# Patient Record
Sex: FEMALE | Race: BLACK OR AFRICAN AMERICAN | NOT HISPANIC OR LATINO | URBAN - METROPOLITAN AREA
[De-identification: names, ages, dates, MRNs, and addresses within clinical notes are randomized per-mention and may not be internally consistent; named-entity substitution may affect disease eponyms.]

---

## 2024-08-28 ENCOUNTER — OFFICE VISIT (OUTPATIENT)
Dept: ORTHOPEDIC SURGERY | Facility: CLINIC | Age: 54
End: 2024-08-28
Payer: COMMERCIAL

## 2024-08-28 DIAGNOSIS — M54.16 LUMBAR RADICULOPATHY: Primary | ICD-10-CM

## 2024-08-28 PROCEDURE — 1036F TOBACCO NON-USER: CPT | Performed by: ORTHOPAEDIC SURGERY

## 2024-08-28 PROCEDURE — 99204 OFFICE O/P NEW MOD 45 MIN: CPT | Performed by: ORTHOPAEDIC SURGERY

## 2024-08-28 ASSESSMENT — PAIN - FUNCTIONAL ASSESSMENT: PAIN_FUNCTIONAL_ASSESSMENT: 0-10

## 2024-08-28 ASSESSMENT — PAIN SCALES - GENERAL: PAINLEVEL_OUTOF10: 7

## 2024-08-28 NOTE — LETTER
August 28, 2024     Dawson Germain MD  [address]  [facility] [address]    Patient: Susan Alegre   YOB: 1970   Date of Visit: 8/28/2024       Dear Dr. Dawson Germain MD:    Thank you for referring Susan Alegre to me for evaluation. Below are my notes for this consultation.  If you have questions, please do not hesitate to call me. I look forward to following your patient along with you.       Sincerely,     John Ellington MD      CC: No Recipients  ______________________________________________________________________________________    This 54-year-old woman presents with longstanding low back pain and recurrent left leg pain over the last 6 months.    She had a remote lumbar decompression by my colleague, Dr. Gutierrez several years ago.  She did do well with this initially.    She localizes the pain to the left buttock posterior thigh and calf.    She has had some initial physical therapy.    She and Dr. Gutierrez had even discussed the possibility of an instrumented spine fusion several years ago but she deferred.    Family, social, and medical histories are obtained and reviewed.    30-point, patient-recorded Review of Systems is personally obtained and reviewed. Inclusive is no history of weight loss, change in appetite, recent change in activity level, change in bowel or bladder habits, fevers, chills, malaise, or night pain.    On exam, she is a massively obese woman in no acute distress.  She has a kyphotic posture.  Healed lumbar scar.  Painless motion both hips.  Her strength is intact in the lower extremities.    Plain film of the lumbar spine shows a laminectomy defect at L4.  She has lateral listhesis and asymmetric disc space narrowing at L3-4.  She has a slight anterolisthesis of L3 on 4.  He has a very degenerative L4-5 disc space.    Her MRI shows severe stenosis at L3-4.    Impression: This 54-year-old woman has severe recurrent lumbar radiculopathy.  She has severe  stenosis and a deformity at L3-4, above a prior L4 laminectomy.    We have discussed her situation at length.  The degree of stenosis that she has would warrant discussion of surgery.  Surgery would require a revision decompression and instrumented fusion.    Her body habitus and massive obesity however I think would be a contraindication to elective spine surgery, as I think she would be at unacceptably high risk of a perioperative complication, adverse event, or poor clinical outcome.    I think she needs a comprehensive program for weight reduction including nutritional counseling, conditioning and exercise program and appropriate pain management.    Ultimately, if she is able to lose a significant amount of weight and reduce her body mass index lower than 35, discussion of surgery could be considered.    ** Dictated with voice recognition software and not immediately reviewed for errors in grammar and/or spelling **

## 2024-08-28 NOTE — PROGRESS NOTES
This 54-year-old woman presents with longstanding low back pain and recurrent left leg pain over the last 6 months.    She had a remote lumbar decompression by my colleague, Dr. Gutierrez several years ago.  She did do well with this initially.    She localizes the pain to the left buttock posterior thigh and calf.    She has had some initial physical therapy.    She and Dr. Gutierrez had even discussed the possibility of an instrumented spine fusion several years ago but she deferred.    Family, social, and medical histories are obtained and reviewed.    30-point, patient-recorded Review of Systems is personally obtained and reviewed. Inclusive is no history of weight loss, change in appetite, recent change in activity level, change in bowel or bladder habits, fevers, chills, malaise, or night pain.    On exam, she is a massively obese woman in no acute distress.  She has a kyphotic posture.  Healed lumbar scar.  Painless motion both hips.  Her strength is intact in the lower extremities.    Plain film of the lumbar spine shows a laminectomy defect at L4.  She has lateral listhesis and asymmetric disc space narrowing at L3-4.  She has a slight anterolisthesis of L3 on 4.  He has a very degenerative L4-5 disc space.    Her MRI shows severe stenosis at L3-4.    Impression: This 54-year-old woman has severe recurrent lumbar radiculopathy.  She has severe stenosis and a deformity at L3-4, above a prior L4 laminectomy.    We have discussed her situation at length.  The degree of stenosis that she has would warrant discussion of surgery.  Surgery would require a revision decompression and instrumented fusion.    Her body habitus and massive obesity however I think would be a contraindication to elective spine surgery, as I think she would be at unacceptably high risk of a perioperative complication, adverse event, or poor clinical outcome.    I think she needs a comprehensive program for weight reduction including nutritional  counseling, conditioning and exercise program and appropriate pain management.    Ultimately, if she is able to lose a significant amount of weight and reduce her body mass index lower than 35, discussion of surgery could be considered.    ** Dictated with voice recognition software and not immediately reviewed for errors in grammar and/or spelling **

## 2024-09-23 ENCOUNTER — APPOINTMENT (OUTPATIENT)
Dept: PAIN MEDICINE | Facility: HOSPITAL | Age: 54
End: 2024-09-23
Payer: COMMERCIAL

## 2024-09-25 ENCOUNTER — OFFICE VISIT (OUTPATIENT)
Dept: PAIN MEDICINE | Facility: HOSPITAL | Age: 54
End: 2024-09-25
Payer: COMMERCIAL

## 2024-09-25 DIAGNOSIS — M48.062 SPINAL STENOSIS OF LUMBAR REGION WITH NEUROGENIC CLAUDICATION: ICD-10-CM

## 2024-09-25 DIAGNOSIS — M54.16 LUMBAR RADICULOPATHY: Primary | ICD-10-CM

## 2024-09-25 DIAGNOSIS — M71.38 SYNOVIAL CYST OF LUMBAR FACET JOINT: ICD-10-CM

## 2024-09-25 DIAGNOSIS — M96.1 POSTLAMINECTOMY SYNDROME OF LUMBAR REGION: ICD-10-CM

## 2024-09-25 PROCEDURE — 99214 OFFICE O/P EST MOD 30 MIN: CPT | Performed by: PAIN MEDICINE

## 2024-09-25 PROCEDURE — 99204 OFFICE O/P NEW MOD 45 MIN: CPT | Performed by: PAIN MEDICINE

## 2024-09-25 RX ORDER — DICLOFENAC SODIUM 10 MG/G
4 GEL TOPICAL
COMMUNITY
Start: 2023-10-20

## 2024-09-25 RX ORDER — TIZANIDINE 4 MG/1
1 TABLET ORAL 3 TIMES DAILY PRN
COMMUNITY
Start: 2015-03-26 | End: 2024-09-25 | Stop reason: SDUPTHER

## 2024-09-25 RX ORDER — FAMOTIDINE 20 MG/1
1 TABLET, FILM COATED ORAL NIGHTLY
COMMUNITY
Start: 2024-07-19

## 2024-09-25 RX ORDER — TIZANIDINE 4 MG/1
4 TABLET ORAL NIGHTLY PRN
Qty: 90 TABLET | Refills: 0 | Status: SHIPPED | OUTPATIENT
Start: 2024-09-25 | End: 2024-12-24

## 2024-09-25 RX ORDER — ERGOCALCIFEROL 1.25 MG/1
1 CAPSULE ORAL
COMMUNITY
Start: 2018-08-10

## 2024-09-25 RX ORDER — LIDOCAINE 50 MG/G
PATCH TOPICAL
COMMUNITY
Start: 2022-06-16

## 2024-09-25 RX ORDER — DEXTROMETHORPHAN HYDROBROMIDE, GUAIFENESIN 5; 100 MG/5ML; MG/5ML
650 LIQUID ORAL
COMMUNITY
Start: 2023-10-20

## 2024-09-25 ASSESSMENT — PAIN SCALES - GENERAL: PAINLEVEL_OUTOF10: 6

## 2024-09-25 ASSESSMENT — PAIN DESCRIPTION - DESCRIPTORS: DESCRIPTORS: NUMBNESS;TINGLING

## 2024-09-25 ASSESSMENT — PAIN - FUNCTIONAL ASSESSMENT: PAIN_FUNCTIONAL_ASSESSMENT: 0-10

## 2024-09-25 NOTE — PROGRESS NOTES
Subjective   Patient ID: Susan Alegre is a 54 y.o. female with a past medical history of L4-5 laminectomy, chronic back pain    HPI:   Susan is here as a new patient for chronic low back pain with a history of L4-5 laminectomy.  She is a previous patient Dr. Agarwal.  Low back pain has been gone for the past 2 years and progressively worsened.  The pain starts in the low back and radiates down the anterior side of her left leg down to her feet.  She recently also had symptoms going down the right leg.  She has problems walking long distances, noting significant weakness and numbness going down her legs.  She now has to ambulate with a cane.  She also finds herself leaning forward to relieve the symptoms.  Currently takes Tylenol and ibuprofen.  She also has an old prescription of tizanidine that helps some.    Physical Therapy: Currently in prescribed physical therapy.  Other Conservative Measures she has tried: Heating Pad, Ice, Massage/myofascial release, and Injections  Classes of medications tried in the past: Acetaminophen, NSAIDs, and Muscle Relaxants    0-10 (Numeric) Pain Score: 6  Pain Type: Chronic pain  Pain Location: Back  Pain Orientation: Lower  Pain Radiating Towards: left leg pain  Pain Descriptors: Numbness; Tingling  Pain Frequency: Constant/continuous       Review of Systems   13-point ROS done and negative except for HPI.     Current Outpatient Medications   Medication Instructions    acetaminophen (TYLENOL 8 HOUR) 650 mg, oral    diclofenac sodium (VOLTAREN) 4 g, Topical    ergocalciferol (Vitamin D-2) 1.25 MG (79925 UT) capsule 1 capsule, oral, Once Weekly    famotidine (Pepcid) 20 mg tablet 1 tablet, oral, Nightly    lidocaine (Lidoderm) 5 % patch APPLY 1 PATCH TO AFFECTED AREA & LEAVE ON FOR 12 HOURS THEN LEAVE OFFFOR 12 HOURS    tiZANidine (Zanaflex) 4 mg tablet 1 tablet, oral, 3 times daily PRN       No past medical history on file.     Past Surgical History:   Procedure Laterality  Date    BACK SURGERY  08/22/2014    Back Surgery    OTHER SURGICAL HISTORY  07/24/2014    Foot Surgery Left    TONSILLECTOMY  08/22/2014    Tonsillectomy        No family history on file.     No Known Allergies     Objective     There were no vitals filed for this visit.     Physical Exam  General: NAD, well groomed, well nourished  Eyes: Non-icteric sclera, EOMI  Ears, Nose, Mouth, and Throat: External ears and nose appear to be without deformity or rash. No lesions or masses noted. Hearing is grossly intact.   Neck: Trachea midline  Respiratory: Nonlabored breathing   Cardiovascular: no peripheral edema   Skin: No rashes or open lesions/ulcers identified on skin.    Back:   Palpation: No tenderness to palpation over lumbar paraspinous muscles.   Straight leg raise: positive at 45 degrees bilaterally  Neurologic:   Cranial nerves grossly intact.   Strength 5/5 and symmetric plantar/dorsiflexion   Sensation: Normal to light touch throughout, pinprick  intact throughout.    Psychiatric: Alert, orientation to person, place, and time. Cooperative.    Imaging personally reviewed and independently interpreted:   Lumbar MRI  Moderate to severe canal stenosis at L3-4  Disc displacement L3-4 causing bilateral moderate to severe neuroforaminal stenosis  Synovial cyst of the right L3-4 facet joint     L3-L4:  There is mild spinal canal stenosis at L3-4 secondary to a combination of  anterior impingement upon the thecal sac from a broad-based osteophyte  disc complex measuring 6 mm in AP diameter along with posterior  impingement from facet arthropathy and ligamentum flavum thickening.  Ligamentum flavum thickening measures on the order of 5 mm. There also  large bilateral synovial joint effusions in association with the facet  arthropathy. There is a 1.4 cm synovial cyst arising from the posterior  aspect of the right L3-4 facet joint there is bilateral foraminal  stenosis with nerve root impingement, slightly more marked on  the left  secondary to a combination of osteophyte disc encroachment and facet  arthropathy.    L4-L5:  There is a laminectomy defect at L4-5 resulting in a capacious appearance  of the thecal sac despite an osteophyte disc complex encroaching upon the  anterior aspect of thecal sac. The complex measures 2-3 mm in AP  diameter. There is marked narrowing of the L4-5 disc space with type 2  degenerative change (fatty metamorphosis) of the adjacent marrow space.  There is bilateral foraminal stenosis, mild in severity secondary to  encroachment from osteophyte disc complex and facet arthropathy with the  encroachment more marked on the right. There is no deformity of the  exiting nerve root.    Assessment/Plan   Susan's clinical picture and MRI imaging are suggestive of lumbar radiculopathy, especially at L3-4.  She has failed conservative management with medications and physical therapy.  She would benefit from epidural steroid injection.  She endorses minimal relief from previous L4, L5 transforaminal epidural steroid injection with Dr. Agarwal in 2020.  We will try L3-L4 LESI first.  It may take couple injections to adequately manage the pain.  She also has a facet cyst on the right side L3-4.  It leg pain persist, will consider intra-articular facet injection.  Could also consider repeat transforaminal LIZETTE at a future time.  She also endorses some relief with tizanidine which takes a Tylenol.  Will refill her standing prescription.  She is also currently in physical therapy.    Plan:  -Schedule L3-4 LESI with preference.  Procedure, risks benefits, alternate therapies discussed.  Not any blood thinners.  -Tizanidine 4 mg nightly as needed    The patient has failed treatment with : Physical therapy , injections, have significant limitations in their sleep quality due to the pain, have significant limitations of their quality of life due to the pain, have significant impairments of their activities of daily living  (ADLs) due to the pain, and have significant impairments of their instrumental activities of daily living (IADLs) due to the pain    We discussed  the risks, benefits and alternatives of the procedure including but not limited to: , Epidural hematoma, Post-dural puncture headache, Lack of efficacy , Transiently worsening pain , Bleeding, Infection , and Nerve Damage    Follow up: As needed     The patient was invited to contact us back anytime with any questions or concerns and follow-up with us in the office as needed.     Diagnoses and all orders for this visit:  Postlaminectomy syndrome of lumbar region  Lumbar radiculopathy  -     Referral to Pain Management      This note was generated with the aid of dictation software, there may be typos despite my attempts at proofreading.    Faisal Jackson, DO  Pain Fellow

## 2024-10-04 ENCOUNTER — TELEMEDICINE CLINICAL SUPPORT (OUTPATIENT)
Dept: NUTRITION | Facility: CLINIC | Age: 54
End: 2024-10-04
Payer: COMMERCIAL

## 2024-10-04 VITALS — HEIGHT: 67 IN | BODY MASS INDEX: 45.99 KG/M2 | WEIGHT: 293 LBS

## 2024-10-04 DIAGNOSIS — M54.16 LUMBAR RADICULOPATHY: Primary | ICD-10-CM

## 2024-10-04 PROCEDURE — 97802 MEDICAL NUTRITION INDIV IN: CPT | Mod: 95 | Performed by: DIETITIAN, REGISTERED

## 2024-10-04 NOTE — PROGRESS NOTES
"Reason for Nutrition Visit:  Pt is a 54 y.o. female being seen remotely. Pt was  referred for M54.16 (ICD-10-CM) - Lumbar radiculopathy by Pastora Alston LPN  effective 8/28/24. 2 pt identifiers were obtained and pt gave permission for this appointment.     1. Lumbar radiculopathy [M54.16]             Past Medical Hx:  There is no problem list on file for this patient.         Current Outpatient Medications:     acetaminophen (Tylenol 8 HOUR) 650 mg ER tablet, Take 1 tablet (650 mg) by mouth., Disp: , Rfl:     diclofenac sodium (Voltaren) 1 % gel, Apply 4.5 inches (4 g) topically., Disp: , Rfl:     ergocalciferol (Vitamin D-2) 1.25 MG (38666 UT) capsule, Take 1 capsule (1,250 mcg) by mouth 1 (one) time per week., Disp: , Rfl:     famotidine (Pepcid) 20 mg tablet, Take 1 tablet (20 mg) by mouth once daily at bedtime., Disp: , Rfl:     lidocaine (Lidoderm) 5 % patch, APPLY 1 PATCH TO AFFECTED AREA & LEAVE ON FOR 12 HOURS THEN LEAVE OFFFOR 12 HOURS, Disp: , Rfl:     tiZANidine (Zanaflex) 4 mg tablet, Take 1 tablet (4 mg) by mouth as needed at bedtime for muscle spasms., Disp: 90 tablet, Rfl: 0     Anthropometrics:  Anthropometrics  Height: 170.2 cm (5' 7.01\")  Weight: 136 kg (300 lb)  BMI (Calculated): 46.98   Weight change:    Significant Weight Change: No   lbs (80 kg)   10/4/24 over 300 lbs     Lab Results   Component Value Date    HGBA1C 5.6 10/20/2023    CHOL 164 01/04/2022    LDLF 84 01/04/2022    TRIG 84 01/04/2022    HDL 63.3 01/04/2022          Chemistry    Lab Results   Component Value Date/Time     01/04/2022 1348    K 4.5 01/04/2022 1348     01/04/2022 1348    CO2 21 01/04/2022 1348    BUN 19 01/04/2022 1348    CREATININE 1.01 01/04/2022 1348    Lab Results   Component Value Date/Time    CALCIUM 9.5 01/04/2022 1348    ALKPHOS 46 01/04/2022 1348    AST 17 01/04/2022 1348    ALT 12 01/04/2022 1348    BILITOT 0.4 01/04/2022 1348        Food and Nutrition Hx:  Pt would like to lose " "weight. She reports she   Pt is in a lot of pain.   Pt reports she is addicted to sugar. She craves sweets. She wakes up with cravings. She is an emotional eater.   She knows what a meal should be. She has seen a dietitian before. She has been in a weight management program. She can not keep food with sugar in the home, she will eat it.   Pt wakes  at 5:00- 6:00 am. Pt does not care for breakfast.     24 Diet Recall:  Meal 1: Breakfast is skipped.   Meal 2: Lunch may be consumed if desired. She may not desire to eat. She eats a salad with light dressing.   Meal 3: Dinner is at 6:30 pm to include [employer] [place] and [place] (kcal 1000)   Snacks:  Beverages: 1- 2 cups of coffee is in the morning with 3 Arabic Vanilla creamer and an unknown amount of sugar (1 T). ~50 ounces of water per day.     Allergies: None  Intolerance: None  Appetite: Good  Intake: >75%  GI Symptoms : None   Swallowing Difficulty: No problems with swallowing  Dentition : own    Types of Activities: Mostly Sedentary    Sleep duration/quality : 5-6 hours and disrupted sleep. Pt takes  PM to help with sleep. If she does not take a sleep aid, she wakes up every hour.   Sleep disorders: none    Supplements: Multivitamin daily  Binging: Never  Cravings: Sweet  Energy Levels: Stable    Food Preparation: Patient  Cooking Skills/Barriers: None reported  Grocery Shopping: Patient    Nutrition Focused Physical Exam:    Performed/Deferred: Deferred as pt visually appears well-nourished with no signs of malnutrition    Estimated Energy Needs:  Energy Needs  Calculated Energy Needs Using Equations  Height: 170.2 cm (5' 7.01\")  Weight Used for Equation Calculations: 136 kg (300 lb)  Echo Rubi Equation (Overweight or Obese Patients): 1989  Equation Chosen to Use by RD: Jennifer Marie  Activity Factor: 1.2  Total Energy Needs: 2386  Estimated Energy Needs  Total Energy Estimated Needs (kCal): 1886 kCal    Nutrition Diagnosis:  Nutrition Diagnosis   "   Patient has Nutrition Diagnosis Yes   Diagnosis Status (1) New   Nutrition Diagnosis 1 Food and nutrition related knowledge deficit   Related to (1) how to eat for wt loss with current BMI at 47   As Evidenced by (1) reports by pt of the need to learn.   Additional Nutrition Diagnosis Diagnosis 2   Diagnosis Status (2) New   Nutrition Diagnosis 2 Excessive energy intake   Related to (2) selection of high calorie foods and large portions with skipped meals   As Evidenced by (2) kcal intake exceeds kcals recommended for wt loss.   Nutrition Diagnosis 3 Excessive carbohydrate intake   Related to (3) intense drive to eat sugar and seletion of foods and beverages with a large amount of added sugar   As Evidenced by (3) added sugar in the diet exceeds daily limitation recommendations       Nutrition Interventions:  Medical nutrition therapy was given for wt loss.   Nutrition Counseling: Motivational Interviewing and CBT  Coordination of Care: None    Nutrition Recommendations:  1,900 calories per day for 1 lb weight loss per week. Anti-inflammatory diet to help reduce inflammation. Adequate protein intake of 80 ounces per day. Adequate water intake of 64 ounces per day. Added sugars limited to less than 25 grams per day. Adequate fiber of 25 grams per day.     Nutrition Goals:  Via teach back method patient verbalized understanding of the following topics:  1) Aim for three meals and 1 -2 snacks per day. Eating three meals per day can increase the metabolism, this is called the thermal effect of eating. Eating three meals burns more calories than two meals consumed per day. Strive to consume breakfast within 1 -2 hours of waking to jump start the metabolism. Aim for breakfast at 7:00-8:00 am, lunch at 11:00- noon, snack at 4:00 pm, dinner at 6:00 and snack at 9:00- 10:00 pm.   2) Strive to include protein at the meals and even snacks. Protein at meals can increase the metabolism too.  Protein at snacks can sustain  energy, stabilize blood glucose, and provide more contentment. Protein foods include eggs, egg whites, cheese, nuts, nut butters, Greek yogurt, poultry, meat, fish, tofu, plant-based protein powder, and/or plant-based protein drinks.   3) Strive to pay attention to the added sugar within foods. Added sugars can drive more sugar intake. The recommendation for women is to limit added sugar to less than 25 grams per day. To identify a low added sugar food, review the %Daily Value as 5% or less is considered a low added sugar food.    Educational Handouts/Practices: Label Reading: Added Sugar  Next Session: Meditation, Foods to reduce inflammation     Desi Porras MS, RDN, LD, LIONEL   Advanced Practice Clinical Dietitian  [email or web address]  Schedule line [phone]  Direct line [phone]     Readiness to Change : Good  Level of Understanding : Good  Anticipated Compliant : Good

## 2024-10-07 NOTE — H&P
Pain Management H&P    History Of Present Illness  Susan Alegre is a 54 y.o. female presents for procedure state below. Endorses no changes in past medical history or medical health since last seen in clinic.      Past Medical History  She has no past medical history on file.    Surgical History  She has a past surgical history that includes Other surgical history (07/24/2014); Tonsillectomy (08/22/2014); and Back surgery (08/22/2014).     Social History  She reports that she has never smoked. She has never used smokeless tobacco. No history on file for alcohol use and drug use.    Family History  No family history on file.     Allergies  Patient has no known allergies.    Review of Symptoms:   Constitutional: Negative for chills, diaphoresis or fever  HENT: Negative for neck swelling  Eyes:.  Negative for eye pain  Respiratory:.  Negative for cough, shortness of breath or wheezing    Cardiovascular:.  Negative for chest pain or palpitations  Gastrointestinal:.  Negative for abdominal pain, nausea and vomiting  Genitourinary:.  Negative for urgency  Musculoskeletal:  Positive for back pain. Positive for joint pain. Denies falls within the past 3 months.  Skin: Negative for wounds or itching   Neurological: Negative for dizziness, seizures, loss of consciousness and weakness  Endo/Heme/Allergies: Does not bruise/bleed easily  Psychiatric/Behavioral: Negative for depression. The patient does not appear anxious.      Pre-sedation Evaluation  ASA class 3  Mallampati score 3     PHYSICAL EXAM  Vitals signs reviewed  Constitutional:       General: Not in acute distress     Appearance: Normal appearance. Not ill-appearing.  HENT:     Head: Normocephalic and atraumatic  Eyes:     Conjunctiva/sclera: Conjunctivae normal  Cardiovascular:     Rate and Rhythm: Normal rate and regular rhythm  Pulmonary:     Effort: No respiratory distress  Abdominal:     Palpations: Abdomen is soft  Musculoskeletal: CARDENAS  Skin:     General:  Skin is warm and dry  Neurological:     General: No focal deficit present  Psychiatric:         Mood and Affect: Mood normal         Behavior: Behavior normal     Last Recorded Vitals  There were no vitals taken for this visit.    Relevant Results  Current Outpatient Medications   Medication Instructions    acetaminophen (TYLENOL 8 HOUR) 650 mg, oral    diclofenac sodium (VOLTAREN) 4 g, Topical    ergocalciferol (Vitamin D-2) 1.25 MG (56012 UT) capsule 1 capsule, oral, Once Weekly    famotidine (Pepcid) 20 mg tablet 1 tablet, oral, Nightly    lidocaine (Lidoderm) 5 % patch APPLY 1 PATCH TO AFFECTED AREA & LEAVE ON FOR 12 HOURS THEN LEAVE OFFFOR 12 HOURS    tiZANidine (ZANAFLEX) 4 mg, oral, Nightly PRN         MR lumbar spine wo IV contrast     Narrative  PROCEDURE:         SPINE LUMBAR WO CONTRAST - [facility]  2010  REASON FOR EXAM: DISEASE OF SPINAL CORD, UNSPECIFIED    RESULT: MRN: [id number]  Patient Name: TANESHA ALEXANDER    STUDY:  SPINE LUMBAR WO CONTRAST; 8/16/2022 9:06 am    INDICATION:  DISEASE OF SPINAL CORD, UNSPECIFIED. Low back pain with left leg pain and  numbness.    COMPARISON:  12/29/2020    ACCESSION NUMBER(S):  [id number]    ORDERING CLINICIAN:  YVROSE GORE    TECHNIQUE:  Multiplanar T1, T2 and STIR images were obtained.    FINDINGS:  The conus medullaris is normal in appearance. The marrow space signal  intensity is without evidence for acute fracture, marrow space infection or  marrow space neoplasm. There is slight straightening the lumbar lordosis  from L3 through S1.    The thecal sac and intervertebral foramina are capacious from midportion of  T10 through the midportion of L1.    L1-L2:  The thecal sac is capacious at this level. The intervertebral foramina are  widely patent.    L2-L3:  The thecal sac is capacious at this level. The intervertebral foramina are  widely patent. Incidental note is made of facet arthropathy bilaterally.    L3-L4:  There is mild spinal canal stenosis at L3-4 secondary to  a combination of  anterior impingement upon the thecal sac from a broad-based osteophyte disc  complex measuring 6 mm in AP diameter along with posterior impingement from  facet arthropathy and ligamentum flavum thickening. Ligamentum flavum  thickening measures on the order of 5 mm. There also large bilateral  synovial joint effusions in association with the facet arthropathy. There is  a 1.4 cm synovial cyst arising from the posterior aspect of the right L3-4  facet joint there is bilateral foraminal stenosis with nerve root  impingement, slightly more marked on the left secondary to a combination of  osteophyte disc encroachment and facet arthropathy.    L4-L5:  There is a laminectomy defect at L4-5 resulting in a capacious appearance of  the thecal sac despite an osteophyte disc complex encroaching upon the  anterior aspect of thecal sac. The complex measures 2-3 mm in AP diameter.  There is marked narrowing of the L4-5 disc space with type 2 degenerative  change (fatty metamorphosis) of the adjacent marrow space. There is  bilateral foraminal stenosis, mild in severity secondary to encroachment  from osteophyte disc complex and facet arthropathy with the encroachment  more marked on the right. There is no deformity of the exiting nerve root.    L5-S1:  Thecal sac and intervertebral foramina are capacious at L5-S1    Impression  1. Lumbar spondylosis is present as detailed above extending from L3 through  L5 please see the individual disc level report above.  2. Particular note is made of spinal canal stenosis and foraminal stenosis  at L3-4 as described above along with marked facet arthropathy including  bilateral synovial joint effusions along with a synovial cyst on the right  at this level.    Dictation workstation:   [id number]    Original Interpreting Physician:   NADINE BERG M.D.  Original Transcribed by/Date: MMODAL Aug 16 2022  8:09A  Original Electronically Signed by/Date: NADINE BERG M.D. Aug  16 2022  4:33P    Addendum Interpreting Physician:  Addendum Transcribed by/Date: NO ADDENDUM  Addendum Electronically Signed by/Date:     No image results found.       No diagnosis found.     ASSESSMENT/PLAN  Susan Alegre is a 54 y.o. female here for L3-4 LESI    Patient denies any recent antibiotic use or infections, denies any blood thinner use, and denies contrast or local anesthetic allergies     Risks, benefits, alternatives discussed. All questions answered to the best of my ability. Patient agrees to proceed.      Our plan is as follows:  - Proceed with aforementioned procedure          DO CHARLES Pascual Pain fellow

## 2024-10-08 ENCOUNTER — HOSPITAL ENCOUNTER (OUTPATIENT)
Facility: HOSPITAL | Age: 54
Discharge: HOME | End: 2024-10-08
Payer: COMMERCIAL

## 2024-10-08 VITALS
OXYGEN SATURATION: 99 % | WEIGHT: 293 LBS | HEART RATE: 64 BPM | DIASTOLIC BLOOD PRESSURE: 75 MMHG | BODY MASS INDEX: 45.99 KG/M2 | RESPIRATION RATE: 16 BRPM | TEMPERATURE: 33.1 F | SYSTOLIC BLOOD PRESSURE: 133 MMHG | HEIGHT: 67 IN

## 2024-10-08 DIAGNOSIS — M54.16 LUMBAR RADICULOPATHY: ICD-10-CM

## 2024-10-08 PROCEDURE — 2500000004 HC RX 250 GENERAL PHARMACY W/ HCPCS (ALT 636 FOR OP/ED): Performed by: PAIN MEDICINE

## 2024-10-08 PROCEDURE — 99152 MOD SED SAME PHYS/QHP 5/>YRS: CPT | Performed by: PAIN MEDICINE

## 2024-10-08 PROCEDURE — 62323 NJX INTERLAMINAR LMBR/SAC: CPT | Performed by: PAIN MEDICINE

## 2024-10-08 PROCEDURE — 2550000001 HC RX 255 CONTRASTS: Performed by: PAIN MEDICINE

## 2024-10-08 RX ORDER — LIDOCAINE HYDROCHLORIDE 5 MG/ML
INJECTION, SOLUTION INFILTRATION; INTRAVENOUS AS NEEDED
Status: COMPLETED | OUTPATIENT
Start: 2024-10-08 | End: 2024-10-08

## 2024-10-08 RX ORDER — DEXAMETHASONE SODIUM PHOSPHATE 10 MG/ML
INJECTION INTRAMUSCULAR; INTRAVENOUS
Status: DISCONTINUED
Start: 2024-10-08 | End: 2024-10-08 | Stop reason: WASHOUT

## 2024-10-08 RX ORDER — DEXAMETHASONE SODIUM PHOSPHATE 10 MG/ML
INJECTION INTRAMUSCULAR; INTRAVENOUS
Status: DISPENSED
Start: 2024-10-08 | End: 2024-10-08

## 2024-10-08 RX ORDER — DEXAMETHASONE SODIUM PHOSPHATE 10 MG/ML
INJECTION INTRAMUSCULAR; INTRAVENOUS AS NEEDED
Status: COMPLETED | OUTPATIENT
Start: 2024-10-08 | End: 2024-10-08

## 2024-10-08 RX ORDER — FENTANYL CITRATE 50 UG/ML
INJECTION, SOLUTION INTRAMUSCULAR; INTRAVENOUS AS NEEDED
Status: COMPLETED | OUTPATIENT
Start: 2024-10-08 | End: 2024-10-08

## 2024-10-08 RX ORDER — MIDAZOLAM HYDROCHLORIDE 1 MG/ML
INJECTION, SOLUTION INTRAMUSCULAR; INTRAVENOUS AS NEEDED
Status: COMPLETED | OUTPATIENT
Start: 2024-10-08 | End: 2024-10-08

## 2024-10-08 RX ORDER — MIDAZOLAM HYDROCHLORIDE 1 MG/ML
INJECTION INTRAMUSCULAR; INTRAVENOUS
Status: DISPENSED
Start: 2024-10-08 | End: 2024-10-08

## 2024-10-08 RX ORDER — FENTANYL CITRATE 50 UG/ML
INJECTION, SOLUTION INTRAMUSCULAR; INTRAVENOUS
Status: DISPENSED
Start: 2024-10-08 | End: 2024-10-08

## 2024-10-08 RX ORDER — LIDOCAINE HYDROCHLORIDE 5 MG/ML
INJECTION, SOLUTION INFILTRATION; INTRAVENOUS
Status: DISCONTINUED
Start: 2024-10-08 | End: 2024-10-08 | Stop reason: WASHOUT

## 2024-10-08 RX ORDER — LIDOCAINE HYDROCHLORIDE 5 MG/ML
INJECTION, SOLUTION INFILTRATION; INTRAVENOUS
Status: DISPENSED
Start: 2024-10-08 | End: 2024-10-08

## 2024-10-08 ASSESSMENT — PAIN - FUNCTIONAL ASSESSMENT
PAIN_FUNCTIONAL_ASSESSMENT: 0-10
PAIN_FUNCTIONAL_ASSESSMENT: 0-10
PAIN_FUNCTIONAL_ASSESSMENT: WONG-BAKER FACES
PAIN_FUNCTIONAL_ASSESSMENT: 0-10
PAIN_FUNCTIONAL_ASSESSMENT: WONG-BAKER FACES

## 2024-10-08 ASSESSMENT — COLUMBIA-SUICIDE SEVERITY RATING SCALE - C-SSRS
6. HAVE YOU EVER DONE ANYTHING, STARTED TO DO ANYTHING, OR PREPARED TO DO ANYTHING TO END YOUR LIFE?: NO
1. IN THE PAST MONTH, HAVE YOU WISHED YOU WERE DEAD OR WISHED YOU COULD GO TO SLEEP AND NOT WAKE UP?: NO
2. HAVE YOU ACTUALLY HAD ANY THOUGHTS OF KILLING YOURSELF?: NO

## 2024-10-08 ASSESSMENT — PAIN SCALES - GENERAL
PAINLEVEL_OUTOF10: 7
PAINLEVEL_OUTOF10: 0 - NO PAIN
PAINLEVEL_OUTOF10: 0 - NO PAIN

## 2024-10-08 ASSESSMENT — ENCOUNTER SYMPTOMS
LOSS OF SENSATION IN FEET: 1
DEPRESSION: 0
OCCASIONAL FEELINGS OF UNSTEADINESS: 1

## 2024-10-08 ASSESSMENT — PAIN DESCRIPTION - DESCRIPTORS: DESCRIPTORS: NUMBNESS;ACHING;SHOOTING

## 2024-10-08 NOTE — DISCHARGE INSTRUCTIONS
DISCHARGE INSTRUCTIONS FOR INJECTIONS     You underwent lumbar interlaminar epidural steroid injection today    After most injections, it is recommended that you relax and limit your activity for the remainder of the day unless you have been told otherwise by your pain physician.  You should not drive a car, operate machinery, or make important legal decisions unless otherwise directed by your pain physician.  You may resume your normal activity, including exercise, tomorrow.      Keep a written pain diary of how much pain relief you experienced following the injection procedure and the length of time of pain relief you experienced pain relief. Following diagnostic injections like medial branch nerve blocks, sacroiliac joint blocks, stellate ganglion injections and other blocks, it is very important you record the specific amount of pain relief you experienced immediately after the injectionand how long it lasted. Your doctor will ask you for this information at your follow up visit.     For all injections, please keep the injection site dry and inspect the site for a couple of days. You may remove the Band-Aid the day of the injection at any time.     Some discomfort, bruising or slight swelling may occur at the injection site. This is not abnormal if it occurs.  If needed you may:    -Take over the counter medication such as Tylenol or Motrin.   -Apply an ice pack for 30 minutes, 2 to 3 times a day for the first 24 hours.     You may shower today; no soaking baths, hot tubs, whirlpools or swimming pools for two days.      If you are given steroids in your injection, it may take 3-5 days for the steroid medication to take effect. You may notice a worsening of your symptoms for 1-2 days after the injection. This is not abnormal.  You may use acetaminophen, ibuprofen, or prescription medication that your doctor may have prescribed for you if you need to do so.     A few common side effects of steroids include facial  flushing, sweating, restlessness, irritability,difficulty sleeping, increase in blood sugar, and increased blood pressure. If you have diabetes, please monitor your blood sugar at least once a day for at least 5 days. If you have poorly controlled high blood pressure, monitoryour blood pressure for at least 2 days and contact your primary care physician if these numbers are unusually high for you.      If you take aspirin or non-steroidal anti-inflammatory drugs (examples are Motrin, Advil, ibuprofen, Naprosyn, Voltaren, Relafen, etc.) you may restart these this evening, but stop taking it 3 days before your next appointment, unless instructed otherwiseby your physician.      You do not need to discontinue non-aspirin-containing pain medications prior to an injection (examples: Celebrex, tramadol, hydrocodone and acetaminophen).      If you take a blood thinning medication (Coumadin, Lovenox, Fragmin,Ticlid, Plavix, Pradaxa, etc.), please discuss this with your primary care physician/cardiologist and your pain physician. These medications MUST be discontinued before you can have an injection safely, without the risk of uncontrolled bleeding. If these medications are not discontinued for an appropriate period of time, you will not be able to receivean injection. Please adhere to instructions given to you about when to restart your blood thinning medication. If you have any questions please reach out to our team.    If you are taking Coumadin, please have your INR checked the morning of your procedure and bring the result to your appointment unless otherwise instructed. If your INR is over 1.2, your injection may need to be rescheduled to avoid uncontrolled bleeding from the needle placement.     Call [facility]  and ask for Pain Management at [phone] between 8am-4pm Monday - Friday if you are experiencing the following:    If you received an epidural or spinal injection:    -Headache that doesnot go away with  medicine, is worse when sitting or standing up, and is greatly relieved upon lying down.   -Severe pain worse than or different than your baseline pain.   -Chills or fever (101º F or greater).   -Drainage or signs of infection at the injection site     Go directly to the Emergency Department if you are experiencing the following and received an epidural or spinal injection:   -Abrupt weakness or progressive weakness in your legs that starts after you leave the clinic.   -Abrupt severe or worsening numbness in your legs.   -Inability to urinate after the injection or loss of bowel or bladder control without the urge to defecate or urinate.     If you have a clinical question that cannot wait until your next appointment, please call [phone] between 8am-4pm Monday - Friday or send a [employer] message. We do our best to return all non-emergency messages within 24 hours, Monday - Friday. A nurse or physician will return your message. You may also try calling and they will do their best to answer your question(s):  - Dr. Loree Alfaro/Vania's nurse ([phone])     If you need to cancel an appointment, please call the scheduling staff at [phone] during normal business hours or leave a message at least 24 hours in advance.     If you are going to be sedated for your next procedure, you MUST have responsible adult who can legally drive accompany you home. You cannot eat or drink for at least eight hours prior to the planned procedure if you are going to receive sedation. You may take your non-blood thinning medications with a small sip of water.

## 2024-11-14 ENCOUNTER — APPOINTMENT (OUTPATIENT)
Dept: NUTRITION | Facility: CLINIC | Age: 54
End: 2024-11-14
Payer: COMMERCIAL

## 2025-01-29 ENCOUNTER — OFFICE VISIT (OUTPATIENT)
Dept: URGENT CARE | Age: 55
End: 2025-01-29
Payer: COMMERCIAL

## 2025-01-29 VITALS
WEIGHT: 293 LBS | HEART RATE: 75 BPM | BODY MASS INDEX: 45.99 KG/M2 | SYSTOLIC BLOOD PRESSURE: 152 MMHG | DIASTOLIC BLOOD PRESSURE: 88 MMHG | OXYGEN SATURATION: 98 % | HEIGHT: 67 IN | TEMPERATURE: 97.1 F

## 2025-01-29 DIAGNOSIS — R05.9 COUGH, UNSPECIFIED TYPE: Primary | ICD-10-CM

## 2025-01-29 DIAGNOSIS — R31.9 HEMATURIA, UNSPECIFIED TYPE: ICD-10-CM

## 2025-01-29 LAB
POC APPEARANCE, URINE: CLEAR
POC BILIRUBIN, URINE: NEGATIVE
POC BLOOD, URINE: ABNORMAL
POC COLOR, URINE: YELLOW
POC GLUCOSE, URINE: NEGATIVE MG/DL
POC KETONES, URINE: NEGATIVE MG/DL
POC LEUKOCYTES, URINE: NEGATIVE
POC NITRITE,URINE: NEGATIVE
POC PH, URINE: 6 PH
POC PROTEIN, URINE: NEGATIVE MG/DL
POC SPECIFIC GRAVITY, URINE: >=1.03
POC UROBILINOGEN, URINE: 0.2 EU/DL

## 2025-01-29 PROCEDURE — 99204 OFFICE O/P NEW MOD 45 MIN: CPT | Performed by: PHYSICIAN ASSISTANT

## 2025-01-29 PROCEDURE — 81003 URINALYSIS AUTO W/O SCOPE: CPT | Performed by: PHYSICIAN ASSISTANT

## 2025-01-29 PROCEDURE — 3008F BODY MASS INDEX DOCD: CPT | Performed by: PHYSICIAN ASSISTANT

## 2025-01-29 RX ORDER — BENZONATATE 200 MG/1
200 CAPSULE ORAL 3 TIMES DAILY PRN
Qty: 20 CAPSULE | Refills: 0 | Status: SHIPPED | OUTPATIENT
Start: 2025-01-29 | End: 2025-02-05

## 2025-01-29 RX ORDER — AMOXICILLIN AND CLAVULANATE POTASSIUM 875; 125 MG/1; MG/1
875 TABLET, FILM COATED ORAL 2 TIMES DAILY
Qty: 14 TABLET | Refills: 0 | Status: SHIPPED | OUTPATIENT
Start: 2025-01-29 | End: 2025-02-05

## 2025-01-29 RX ORDER — AZITHROMYCIN 250 MG/1
250 TABLET, FILM COATED ORAL DAILY
Qty: 5 TABLET | Refills: 0 | Status: SHIPPED | OUTPATIENT
Start: 2025-01-29 | End: 2025-02-03

## 2025-01-29 ASSESSMENT — ENCOUNTER SYMPTOMS
SHORTNESS OF BREATH: 1
FREQUENCY: 1
COUGH: 1
DYSURIA: 1

## 2025-01-29 ASSESSMENT — PAIN SCALES - GENERAL: PAINLEVEL_OUTOF10: 3

## 2025-01-29 NOTE — PROGRESS NOTES
"Subjective   Patient ID: Susan Alegre is a 55 y.o. female. They present today with a chief complaint of Other (Patient c/o left ear pain and SOB over one week./Sx hemauria, irritation x 3 days. ). No cp/f/c/n/v/d.    Past Medical History  Allergies as of 01/29/2025    (No Known Allergies)       (Not in a hospital admission)       No past medical history on file.    Past Surgical History:   Procedure Laterality Date    BACK SURGERY  08/22/2014    Back Surgery    OTHER SURGICAL HISTORY  07/24/2014    Foot Surgery Left    TONSILLECTOMY  08/22/2014    Tonsillectomy        reports that she has never smoked. She has never used smokeless tobacco.    Review of Systems  Review of Systems   HENT:  Positive for congestion and ear pain.    Respiratory:  Positive for cough and shortness of breath.    Genitourinary:  Positive for dysuria and frequency.                                  Objective    Vitals:    01/29/25 1754   BP: 152/88   Pulse: 75   Temp: 36.2 °C (97.1 °F)   SpO2: 98%   Weight: 138 kg (305 lb)   Height: 1.702 m (5' 7\")     No LMP recorded.    Physical Exam  Vitals and nursing note reviewed.   Constitutional:       Appearance: Normal appearance.   HENT:      Head:      Comments: Budging erythematous L TM.   Eyes:      Conjunctiva/sclera: Conjunctivae normal.   Cardiovascular:      Rate and Rhythm: Normal rate.   Pulmonary:      Effort: Pulmonary effort is normal.      Comments: Mild exp wheezing on L   Neurological:      Mental Status: She is alert.   Psychiatric:         Mood and Affect: Mood normal.         Procedures    Point of Care Test & Imaging Results from this visit  Results for orders placed or performed in visit on 01/29/25   POCT UA Automated manually resulted   Result Value Ref Range    POC Color, Urine Yellow Straw, Yellow, Light-Yellow    POC Appearance, Urine Clear Clear    POC Glucose, Urine NEGATIVE NEGATIVE mg/dl    POC Bilirubin, Urine NEGATIVE NEGATIVE    POC Ketones, Urine NEGATIVE NEGATIVE " mg/dl    POC Specific Gravity, Urine >=1.030 1.005 - 1.035    POC Blood, Urine TRACE-Intact (A) NEGATIVE    POC PH, Urine 6.0 No Reference Range Established PH    POC Protein, Urine NEGATIVE NEGATIVE mg/dl    POC Urobilinogen, Urine 0.2 0.2, 1.0 EU/DL    Poc Nitrite, Urine NEGATIVE NEGATIVE    POC Leukocytes, Urine NEGATIVE NEGATIVE      No results found.    Diagnostic study results (if any) were reviewed by [facility].    Assessment/Plan   Allergies, medications, history, and pertinent labs/EKGs/Imaging reviewed by Chetan Kraus PA-C.     Medical Decision Making  Findings consistent with AOM without rupture. No evidence of mastoiditis, PTA, PNA on exam. Education on supportive measures and return precautions.   Amox, azithromycin  Will also cover for CAP, UTI suspicion .      Orders and Diagnoses  Diagnoses and all orders for this visit:  Cough, unspecified type  -     benzonatate (Tessalon) 200 mg capsule; Take 1 capsule (200 mg) by mouth 3 times a day as needed for cough for up to 7 days. Do not crush or chew.  -     azithromycin (Zithromax) 250 mg tablet; Take 1 tablet (250 mg) by mouth once daily for 5 days. Take 2 tabs (500 mg) by mouth today, then take 1 tab daily for 4 days.  -     amoxicillin-pot clavulanate (Augmentin) 875-125 mg tablet; Take 1 tablet (875 mg) by mouth 2 times a day for 7 days.  Hematuria, unspecified type  -     POCT UA Automated manually resulted  -     benzonatate (Tessalon) 200 mg capsule; Take 1 capsule (200 mg) by mouth 3 times a day as needed for cough for up to 7 days. Do not crush or chew.  -     azithromycin (Zithromax) 250 mg tablet; Take 1 tablet (250 mg) by mouth once daily for 5 days. Take 2 tabs (500 mg) by mouth today, then take 1 tab daily for 4 days.  -     amoxicillin-pot clavulanate (Augmentin) 875-125 mg tablet; Take 1 tablet (875 mg) by mouth 2 times a day for 7 days.      Medical Admin Record      Patient disposition: Home    Electronically signed by  [facility]  6:31 PM